# Patient Record
Sex: MALE | Race: WHITE | URBAN - METROPOLITAN AREA
[De-identification: names, ages, dates, MRNs, and addresses within clinical notes are randomized per-mention and may not be internally consistent; named-entity substitution may affect disease eponyms.]

---

## 2023-09-01 ENCOUNTER — APPOINTMENT (OUTPATIENT)
Dept: MRI IMAGING | Age: 43
DRG: 642 | End: 2023-09-01

## 2023-09-01 ENCOUNTER — APPOINTMENT (OUTPATIENT)
Dept: GENERAL RADIOLOGY | Age: 43
DRG: 642 | End: 2023-09-01

## 2023-09-01 ENCOUNTER — APPOINTMENT (OUTPATIENT)
Dept: CT IMAGING | Age: 43
DRG: 642 | End: 2023-09-01

## 2023-09-01 ENCOUNTER — HOSPITAL ENCOUNTER (INPATIENT)
Age: 43
LOS: 2 days | Discharge: HOME | DRG: 642 | End: 2023-09-04
Attending: STUDENT IN AN ORGANIZED HEALTH CARE EDUCATION/TRAINING PROGRAM | Admitting: INTERNAL MEDICINE

## 2023-09-01 DIAGNOSIS — R94.5 ABNORMAL LIVER FUNCTION: ICD-10-CM

## 2023-09-01 DIAGNOSIS — R47.9 SPEECH DISTURBANCE, UNSPECIFIED TYPE: ICD-10-CM

## 2023-09-01 DIAGNOSIS — R41.82 ALTERED MENTAL STATUS, UNSPECIFIED ALTERED MENTAL STATUS TYPE: Primary | ICD-10-CM

## 2023-09-01 DIAGNOSIS — K85.90 ACUTE PANCREATITIS, UNSPECIFIED COMPLICATION STATUS, UNSPECIFIED PANCREATITIS TYPE: ICD-10-CM

## 2023-09-01 DIAGNOSIS — G83.9 PARESIS (HCC): ICD-10-CM

## 2023-09-01 LAB
ALBUMIN SERPL-MCNC: 4.6 G/DL (ref 3.4–5)
ALBUMIN/GLOB SERPL: 1.1 {RATIO} (ref 1.1–2.2)
ALP SERPL-CCNC: 108 U/L (ref 40–129)
ALT SERPL-CCNC: 129 U/L (ref 10–40)
ANION GAP SERPL CALCULATED.3IONS-SCNC: 13 MMOL/L (ref 3–16)
AST SERPL-CCNC: 96 U/L (ref 15–37)
BASE EXCESS BLDV CALC-SCNC: 4.6 MMOL/L (ref -2–3)
BASOPHILS # BLD: 0.1 K/UL (ref 0–0.2)
BASOPHILS NFR BLD: 0.6 %
BILIRUB SERPL-MCNC: 0.5 MG/DL (ref 0–1)
BUN SERPL-MCNC: 11 MG/DL (ref 7–20)
CALCIUM SERPL-MCNC: 10.1 MG/DL (ref 8.3–10.6)
CHLORIDE SERPL-SCNC: 98 MMOL/L (ref 99–110)
CO2 BLDV-SCNC: 31 MMOL/L
CO2 SERPL-SCNC: 26 MMOL/L (ref 21–32)
COHGB MFR BLDV: 1.1 % (ref 0–1.5)
CREAT SERPL-MCNC: 0.8 MG/DL (ref 0.9–1.3)
DEPRECATED RDW RBC AUTO: 12.3 % (ref 12.4–15.4)
DO-HGB MFR BLDV: 40.5 %
EOSINOPHIL # BLD: 0.3 K/UL (ref 0–0.6)
EOSINOPHIL NFR BLD: 3 %
ETHANOLAMINE SERPL-MCNC: NORMAL MG/DL (ref 0–0.08)
GFR SERPLBLD CREATININE-BSD FMLA CKD-EPI: >60 ML/MIN/{1.73_M2}
GLUCOSE BLD-MCNC: 90 MG/DL (ref 70–99)
GLUCOSE SERPL-MCNC: 115 MG/DL (ref 70–99)
HCO3 BLDV-SCNC: 29.7 MMOL/L (ref 24–28)
HCT VFR BLD AUTO: 50.7 % (ref 40.5–52.5)
HGB BLD-MCNC: 17.9 G/DL (ref 13.5–17.5)
LACTATE BLDV-SCNC: 0.9 MMOL/L (ref 0.4–1.9)
LACTATE BLDV-SCNC: 1.2 MMOL/L (ref 0.4–1.9)
LIPASE SERPL-CCNC: 554 U/L (ref 13–60)
LYMPHOCYTES # BLD: 2.3 K/UL (ref 1–5.1)
LYMPHOCYTES NFR BLD: 25.1 %
MAGNESIUM SERPL-MCNC: 2.2 MG/DL (ref 1.8–2.4)
MCH RBC QN AUTO: 33.3 PG (ref 26–34)
MCHC RBC AUTO-ENTMCNC: 35.4 G/DL (ref 31–36)
MCV RBC AUTO: 94.1 FL (ref 80–100)
METHGB MFR BLDV: 0.1 % (ref 0–1.5)
MONOCYTES # BLD: 1.1 K/UL (ref 0–1.3)
MONOCYTES NFR BLD: 11.5 %
NEUTROPHILS # BLD: 5.4 K/UL (ref 1.7–7.7)
NEUTROPHILS NFR BLD: 59.8 %
PCO2 BLDV: 44.1 MMHG (ref 41–51)
PERFORMED ON: NORMAL
PH BLDV: 7.44 [PH] (ref 7.35–7.45)
PLATELET # BLD AUTO: 309 K/UL (ref 135–450)
PMV BLD AUTO: 8.1 FL (ref 5–10.5)
PO2 BLDV: 30.7 MMHG (ref 25–40)
POTASSIUM SERPL-SCNC: 3.9 MMOL/L (ref 3.5–5.1)
PROT SERPL-MCNC: 8.9 G/DL (ref 6.4–8.2)
RBC # BLD AUTO: 5.39 M/UL (ref 4.2–5.9)
SAO2 % BLDV: 59 %
SODIUM SERPL-SCNC: 137 MMOL/L (ref 136–145)
TROPONIN, HIGH SENSITIVITY: 10 NG/L (ref 0–22)
TROPONIN, HIGH SENSITIVITY: 9 NG/L (ref 0–22)
WBC # BLD AUTO: 9.1 K/UL (ref 4–11)

## 2023-09-01 PROCEDURE — 70450 CT HEAD/BRAIN W/O DYE: CPT

## 2023-09-01 PROCEDURE — 82077 ASSAY SPEC XCP UR&BREATH IA: CPT

## 2023-09-01 PROCEDURE — 93005 ELECTROCARDIOGRAM TRACING: CPT | Performed by: INTERNAL MEDICINE

## 2023-09-01 PROCEDURE — 95717 EEG PHYS/QHP 2-12 HR W/O VID: CPT | Performed by: PSYCHIATRY & NEUROLOGY

## 2023-09-01 PROCEDURE — 83605 ASSAY OF LACTIC ACID: CPT

## 2023-09-01 PROCEDURE — 70498 CT ANGIOGRAPHY NECK: CPT

## 2023-09-01 PROCEDURE — 6360000004 HC RX CONTRAST MEDICATION

## 2023-09-01 PROCEDURE — 83690 ASSAY OF LIPASE: CPT

## 2023-09-01 PROCEDURE — 70551 MRI BRAIN STEM W/O DYE: CPT

## 2023-09-01 PROCEDURE — 82803 BLOOD GASES ANY COMBINATION: CPT

## 2023-09-01 PROCEDURE — 71045 X-RAY EXAM CHEST 1 VIEW: CPT

## 2023-09-01 PROCEDURE — 83735 ASSAY OF MAGNESIUM: CPT

## 2023-09-01 PROCEDURE — 85025 COMPLETE CBC W/AUTO DIFF WBC: CPT

## 2023-09-01 PROCEDURE — 84484 ASSAY OF TROPONIN QUANT: CPT

## 2023-09-01 PROCEDURE — 99285 EMERGENCY DEPT VISIT HI MDM: CPT

## 2023-09-01 PROCEDURE — 80053 COMPREHEN METABOLIC PANEL: CPT

## 2023-09-01 PROCEDURE — 84443 ASSAY THYROID STIM HORMONE: CPT

## 2023-09-01 PROCEDURE — 80074 ACUTE HEPATITIS PANEL: CPT

## 2023-09-01 RX ORDER — SODIUM CHLORIDE 0.9 % (FLUSH) 0.9 %
5-40 SYRINGE (ML) INJECTION PRN
Status: DISCONTINUED | OUTPATIENT
Start: 2023-09-01 | End: 2023-09-04 | Stop reason: HOSPADM

## 2023-09-01 RX ORDER — SODIUM CHLORIDE 0.9 % (FLUSH) 0.9 %
5-40 SYRINGE (ML) INJECTION PRN
Status: CANCELLED | OUTPATIENT
Start: 2023-09-01

## 2023-09-01 RX ORDER — SODIUM CHLORIDE 0.9 % (FLUSH) 0.9 %
5-40 SYRINGE (ML) INJECTION EVERY 12 HOURS SCHEDULED
Status: DISCONTINUED | OUTPATIENT
Start: 2023-09-01 | End: 2023-09-04 | Stop reason: HOSPADM

## 2023-09-01 RX ORDER — SODIUM CHLORIDE 9 MG/ML
INJECTION, SOLUTION INTRAVENOUS PRN
Status: DISCONTINUED | OUTPATIENT
Start: 2023-09-01 | End: 2023-09-04 | Stop reason: HOSPADM

## 2023-09-01 RX ORDER — SODIUM CHLORIDE 0.9 % (FLUSH) 0.9 %
10 SYRINGE (ML) INJECTION ONCE
Status: DISCONTINUED | OUTPATIENT
Start: 2023-09-01 | End: 2023-09-02

## 2023-09-01 RX ORDER — SODIUM CHLORIDE 9 MG/ML
INJECTION, SOLUTION INTRAVENOUS PRN
Status: CANCELLED | OUTPATIENT
Start: 2023-09-01

## 2023-09-01 RX ORDER — SODIUM CHLORIDE 0.9 % (FLUSH) 0.9 %
5-40 SYRINGE (ML) INJECTION EVERY 12 HOURS SCHEDULED
Status: CANCELLED | OUTPATIENT
Start: 2023-09-01

## 2023-09-01 RX ADMIN — IOPAMIDOL 75 ML: 755 INJECTION, SOLUTION INTRAVENOUS at 18:34

## 2023-09-01 ASSESSMENT — PAIN - FUNCTIONAL ASSESSMENT: PAIN_FUNCTIONAL_ASSESSMENT: 0-10

## 2023-09-01 NOTE — ED PROVIDER NOTES
[facility]          EM RESIDENT NOTE     Date of evaluation: 9/1/2023    ADDENDUM:      Care of this patient was assumed from Dr. Silvestre Morgan. The patient was seen for Altered Mental Status (Patient was at home sitting in his car for about 10 mins. Patient went inside and sat in the couch with no response. Patient Last know normal was 4 pm. ) and Cerebrovascular Accident  . The patient's initial evaluation and plan have been discussed with the prior provider who initially evaluated the patient. Nursing Notes, Past Medical Hx, Past Surgical Hx, Social Hx, Allergies, and Family Hx were all reviewed. Pending: MRI, repeat trop, consider CT A/P    [facility] / ASSESSMENT / Shai Shows is a 43 y.o. male presenting with AMS. Started feeling \"weird\" earlier today, got home from work and was very confused, couldn't speak, slowly lost all motor function. Had a recent diarrheal illness. Arrived via EMS. Initially aphasic, could not move extremities or follow commands. Now able to follow commands and move all 4 extremities with very little resistance against gravity. C/f stroke of unclear origin. CT head neg. CTA head/neck without LVO. Activated Code Stroke, stroke team conducted telehealth visit, want wake-up MRI as symptoms do not follow a specific vascular territory. On my reassessment, patient is still nonverbal however responds to questions with \"mhmm\" or \"uhuh\" which would be unexpected with aphasia. Strength 5/5 in the LUE and LLE, 4/5 in the RUE and RLE which is significant improvement from initial exam. Benign abdominal exam in s/o elevated AST/ALT and lipase, will defer CT A/P at this time. MRI neg for acute intracranial abnormality, less c/f stroke as etiology of symptoms. However, patient having nonfocal neurological symptoms, consider GBS in s/o recent diarrheal illness.   On further evaluation by attending physician, patient still not speaking, however is able to text to communicate which is not c/w true aphasia. Given negative stroke work-up, improvement in motor exam, and lack of true aphasia, c/f functional element to patient symptoms. LP deferred at this time. However, patient not able to perform daily functions in this current state and will be admitted for further evaluation and optimization. Is this patient to be included in the SEP-1 core measure? No Exclusion criteria - the patient is NOT to be included for SEP-1 Core Measure due to: 2+ SIRS criteria are not met    Medical Decision Making  Amount and/or Complexity of Data Reviewed  Labs: ordered. Radiology: ordered. Risk  Prescription drug management. Decision regarding hospitalization. This patient was also evaluated by the attending physician. All care plans were discussed and agreed upon. Clinical Impression     1. Altered mental status, unspecified altered mental status type    2. Paresis ([address])    3. Speech disturbance, unspecified type    4. Acute pancreatitis, unspecified complication status, unspecified pancreatitis type    5. Abnormal liver function        Disposition     PATIENT REFERRED TO:  No follow-up provider specified. DISCHARGE MEDICATIONS:  New Prescriptions    No medications on file       DISPOSITION Admitted 09/02/2023 12:37:40 AM    At this time the patient has been admitted to Hospitalist for further evaluation and management. The patient will continue to be monitored here in the emergency department until which time he is moved to his new treatment location. Discussed patient's case with admitting physicians. Diagnostic Results and Other Data     RADIOLOGY:  XR CHEST PORTABLE   Final Result      No acute pulmonary disease. MRI BRAIN WO CONTRAST   Final Result      1. No acute intracranial abnormality. CTA HEAD NECK W CONTRAST   Final Result      1. No aneurysms, vascular occlusions, or intracranial stenoses identified.

## 2023-09-01 NOTE — ED NOTES
Hold TNK per MD. Wants to talk to neuro on ipad before decision to give.       Amina Huff RN  09/01/23 [id number]

## 2023-09-01 NOTE — ED PROVIDER NOTES
ED Attending Attestation Note     Date of evaluation: 9/1/2023    This patient was seen by the resident. I have seen and examined the patient, agree with the workup, evaluation, management and diagnosis. The care plan has been discussed. I have reviewed the ECG and concur with the resident's interpretation. This is a patient with no reported significant past medical history who presents by EMS from home with concern for altered mental status and abnormal neurologic exam.  History is initially very limited and is obtained primarily from the wife who notes that he returned home from work where he spent an abnormal amount of time in the garage prior to coming inside where he was found sitting on the couch with a \"glazed look \"and was unresponsive. EMS notes that in route, he had no motor function in any of his extremities, did not appear to respond to pain. He was mildly hypertensive but had a normal glucose. On my initial exam, the patient is mildly ill lying in bed with his eyes open. There is no gaze deviation however initially does not seem to track although this quickly resolves and on further evaluation, it is noted that he blinks to threat and has some restriction in the rightward gaze. He is noted to be spontaneously moving his left hand and is able to wiggle fingers and toes on the left side to command but unable to do it on the right side. He does appear tearful. No obvious facial droop or abnormal tone. He appears to be protecting his airway with no respiratory depression. As he was further settled into the emergency department, it is noted that he will open his mouth and stick out his tongue but when asked to try and make sounds of any kind, he will not make any mouth movements or tongue movements despite instruction to do so.   He continues to have only some effort against gravity in the right upper and lower extremities compared to the left where he is able to at least sustain some

## 2023-09-02 PROBLEM — R41.82 AMS (ALTERED MENTAL STATUS): Status: ACTIVE | Noted: 2023-09-02

## 2023-09-02 LAB
ALBUMIN SERPL-MCNC: 4.1 G/DL (ref 3.4–5)
ALP SERPL-CCNC: 96 U/L (ref 40–129)
ALT SERPL-CCNC: 122 U/L (ref 10–40)
AMPHETAMINES UR QL SCN>1000 NG/ML: ABNORMAL
ANION GAP SERPL CALCULATED.3IONS-SCNC: 18 MMOL/L (ref 3–16)
AST SERPL-CCNC: 94 U/L (ref 15–37)
BARBITURATES UR QL SCN>200 NG/ML: ABNORMAL
BASOPHILS # BLD: 0 K/UL (ref 0–0.2)
BASOPHILS NFR BLD: 0.6 %
BENZODIAZ UR QL SCN>200 NG/ML: ABNORMAL
BILIRUB DIRECT SERPL-MCNC: <0.2 MG/DL (ref 0–0.3)
BILIRUB INDIRECT SERPL-MCNC: ABNORMAL MG/DL (ref 0–1)
BILIRUB SERPL-MCNC: 0.5 MG/DL (ref 0–1)
BILIRUB UR QL STRIP.AUTO: ABNORMAL
BUN SERPL-MCNC: 12 MG/DL (ref 7–20)
CALCIUM SERPL-MCNC: 9.1 MG/DL (ref 8.3–10.6)
CALCIUM SERPL-MCNC: 9.3 MG/DL (ref 8.3–10.6)
CANNABINOIDS UR QL SCN>50 NG/ML: POSITIVE
CHLORIDE SERPL-SCNC: 103 MMOL/L (ref 99–110)
CHOLEST SERPL-MCNC: 214 MG/DL (ref 0–199)
CLARITY UR: CLEAR
CO2 SERPL-SCNC: 17 MMOL/L (ref 21–32)
COCAINE UR QL SCN: ABNORMAL
COLOR UR: YELLOW
CREAT SERPL-MCNC: 0.7 MG/DL (ref 0.9–1.3)
CRP SERPL-MCNC: 9.8 MG/L (ref 0–5.1)
DEPRECATED RDW RBC AUTO: 12.4 % (ref 12.4–15.4)
DRUG SCREEN COMMENT UR-IMP: ABNORMAL
EOSINOPHIL # BLD: 0.4 K/UL (ref 0–0.6)
EOSINOPHIL NFR BLD: 5.3 %
FENTANYL SCREEN, URINE: ABNORMAL
GFR SERPLBLD CREATININE-BSD FMLA CKD-EPI: >60 ML/MIN/{1.73_M2}
GLUCOSE BLD-MCNC: 90 MG/DL (ref 70–99)
GLUCOSE SERPL-MCNC: 97 MG/DL (ref 70–99)
GLUCOSE UR STRIP.AUTO-MCNC: NEGATIVE MG/DL
HAV IGM SERPL QL IA: NORMAL
HBV CORE IGM SERPL QL IA: NORMAL
HBV SURFACE AG SERPL QL IA: NORMAL
HCT VFR BLD AUTO: 46.1 % (ref 40.5–52.5)
HCV AB SERPL QL IA: NORMAL
HDLC SERPL-MCNC: 33 MG/DL (ref 40–60)
HGB BLD-MCNC: 16.5 G/DL (ref 13.5–17.5)
HGB UR QL STRIP.AUTO: NEGATIVE
KETONES UR STRIP.AUTO-MCNC: ABNORMAL MG/DL
LDLC SERPL CALC-MCNC: 160 MG/DL
LEUKOCYTE ESTERASE UR QL STRIP.AUTO: NEGATIVE
LYMPHOCYTES # BLD: 2.1 K/UL (ref 1–5.1)
LYMPHOCYTES NFR BLD: 24.5 %
MAGNESIUM SERPL-MCNC: 2 MG/DL (ref 1.8–2.4)
MCH RBC QN AUTO: 33.6 PG (ref 26–34)
MCHC RBC AUTO-ENTMCNC: 35.8 G/DL (ref 31–36)
MCV RBC AUTO: 93.7 FL (ref 80–100)
METHADONE UR QL SCN>300 NG/ML: ABNORMAL
MONOCYTES # BLD: 1.1 K/UL (ref 0–1.3)
MONOCYTES NFR BLD: 12.7 %
MUCOUS THREADS #/AREA URNS LPF: ABNORMAL /LPF
NEUTROPHILS # BLD: 4.8 K/UL (ref 1.7–7.7)
NEUTROPHILS NFR BLD: 56.9 %
NITRITE UR QL STRIP.AUTO: NEGATIVE
OPIATES UR QL SCN>300 NG/ML: ABNORMAL
OXYCODONE UR QL SCN: ABNORMAL
PCP UR QL SCN>25 NG/ML: ABNORMAL
PERFORMED ON: NORMAL
PH UR STRIP.AUTO: 6.5 [PH] (ref 5–8)
PH UR STRIP: 6.5 [PH]
PHOSPHATE SERPL-MCNC: 3.5 MG/DL (ref 2.5–4.9)
PLATELET # BLD AUTO: 272 K/UL (ref 135–450)
PMV BLD AUTO: 8 FL (ref 5–10.5)
POTASSIUM SERPL-SCNC: 5.5 MMOL/L (ref 3.5–5.1)
PROT SERPL-MCNC: 7.3 G/DL (ref 6.4–8.2)
PROT UR STRIP.AUTO-MCNC: ABNORMAL MG/DL
RBC # BLD AUTO: 4.92 M/UL (ref 4.2–5.9)
RBC #/AREA URNS HPF: ABNORMAL /HPF (ref 0–4)
SODIUM SERPL-SCNC: 138 MMOL/L (ref 136–145)
SP GR UR STRIP.AUTO: 1.02 (ref 1–1.03)
T4 FREE SERPL-MCNC: 1.6 NG/DL (ref 0.9–1.8)
TRIGL SERPL-MCNC: 104 MG/DL (ref 0–150)
TSH SERPL DL<=0.005 MIU/L-ACNC: 2.07 UIU/ML (ref 0.27–4.2)
UA COMPLETE W REFLEX CULTURE PNL UR: ABNORMAL
UA DIPSTICK W REFLEX MICRO PNL UR: YES
URN SPEC COLLECT METH UR: ABNORMAL
UROBILINOGEN UR STRIP-ACNC: 0.2 E.U./DL
VIT B12 SERPL-MCNC: 735 PG/ML (ref 211–911)
VLDLC SERPL CALC-MCNC: 21 MG/DL
WBC # BLD AUTO: 8.5 K/UL (ref 4–11)
WBC #/AREA URNS HPF: ABNORMAL /HPF (ref 0–5)

## 2023-09-02 PROCEDURE — 92610 EVALUATE SWALLOWING FUNCTION: CPT

## 2023-09-02 PROCEDURE — 1200000000 HC SEMI PRIVATE

## 2023-09-02 PROCEDURE — 80307 DRUG TEST PRSMV CHEM ANLYZR: CPT

## 2023-09-02 PROCEDURE — 82310 ASSAY OF CALCIUM: CPT

## 2023-09-02 PROCEDURE — 84100 ASSAY OF PHOSPHORUS: CPT

## 2023-09-02 PROCEDURE — 80061 LIPID PANEL: CPT

## 2023-09-02 PROCEDURE — 84466 ASSAY OF TRANSFERRIN: CPT

## 2023-09-02 PROCEDURE — 84439 ASSAY OF FREE THYROXINE: CPT

## 2023-09-02 PROCEDURE — 80076 HEPATIC FUNCTION PANEL: CPT

## 2023-09-02 PROCEDURE — 6370000000 HC RX 637 (ALT 250 FOR IP)

## 2023-09-02 PROCEDURE — 83735 ASSAY OF MAGNESIUM: CPT

## 2023-09-02 PROCEDURE — 81001 URINALYSIS AUTO W/SCOPE: CPT

## 2023-09-02 PROCEDURE — 6360000002 HC RX W HCPCS: Performed by: STUDENT IN AN ORGANIZED HEALTH CARE EDUCATION/TRAINING PROGRAM

## 2023-09-02 PROCEDURE — 82607 VITAMIN B-12: CPT

## 2023-09-02 PROCEDURE — 83540 ASSAY OF IRON: CPT

## 2023-09-02 PROCEDURE — 51798 US URINE CAPACITY MEASURE: CPT

## 2023-09-02 PROCEDURE — 86140 C-REACTIVE PROTEIN: CPT

## 2023-09-02 PROCEDURE — 2580000003 HC RX 258: Performed by: STUDENT IN AN ORGANIZED HEALTH CARE EDUCATION/TRAINING PROGRAM

## 2023-09-02 PROCEDURE — 80048 BASIC METABOLIC PNL TOTAL CA: CPT

## 2023-09-02 PROCEDURE — 36415 COLL VENOUS BLD VENIPUNCTURE: CPT

## 2023-09-02 PROCEDURE — 94799 UNLISTED PULMONARY SVC/PX: CPT

## 2023-09-02 PROCEDURE — 85025 COMPLETE CBC W/AUTO DIFF WBC: CPT

## 2023-09-02 RX ORDER — ACETAMINOPHEN 325 MG/1
650 TABLET ORAL EVERY 6 HOURS PRN
Status: DISCONTINUED | OUTPATIENT
Start: 2023-09-02 | End: 2023-09-04 | Stop reason: HOSPADM

## 2023-09-02 RX ORDER — MULTIVITAMIN WITH IRON
1 TABLET ORAL DAILY
Status: DISCONTINUED | OUTPATIENT
Start: 2023-09-02 | End: 2023-09-04 | Stop reason: HOSPADM

## 2023-09-02 RX ORDER — SODIUM CHLORIDE 9 MG/ML
INJECTION, SOLUTION INTRAVENOUS PRN
Status: DISCONTINUED | OUTPATIENT
Start: 2023-09-02 | End: 2023-09-04 | Stop reason: HOSPADM

## 2023-09-02 RX ORDER — SODIUM CHLORIDE 0.9 % (FLUSH) 0.9 %
5-40 SYRINGE (ML) INJECTION EVERY 12 HOURS SCHEDULED
Status: DISCONTINUED | OUTPATIENT
Start: 2023-09-02 | End: 2023-09-04 | Stop reason: HOSPADM

## 2023-09-02 RX ORDER — SODIUM CHLORIDE 9 MG/ML
INJECTION, SOLUTION INTRAVENOUS CONTINUOUS
Status: ACTIVE | OUTPATIENT
Start: 2023-09-02 | End: 2023-09-02

## 2023-09-02 RX ORDER — SODIUM CHLORIDE 0.9 % (FLUSH) 0.9 %
5-40 SYRINGE (ML) INJECTION PRN
Status: DISCONTINUED | OUTPATIENT
Start: 2023-09-02 | End: 2023-09-04 | Stop reason: HOSPADM

## 2023-09-02 RX ORDER — THIAMINE HYDROCHLORIDE 100 MG/ML
100 INJECTION, SOLUTION INTRAMUSCULAR; INTRAVENOUS DAILY
Status: DISCONTINUED | OUTPATIENT
Start: 2023-09-03 | End: 2023-09-04 | Stop reason: HOSPADM

## 2023-09-02 RX ORDER — POLYETHYLENE GLYCOL 3350 17 G/17G
17 POWDER, FOR SOLUTION ORAL DAILY PRN
Status: DISCONTINUED | OUTPATIENT
Start: 2023-09-02 | End: 2023-09-04 | Stop reason: HOSPADM

## 2023-09-02 RX ORDER — HYDRALAZINE HYDROCHLORIDE 20 MG/ML
5 INJECTION INTRAMUSCULAR; INTRAVENOUS EVERY 6 HOURS PRN
Status: DISCONTINUED | OUTPATIENT
Start: 2023-09-02 | End: 2023-09-03

## 2023-09-02 RX ORDER — GAUZE BANDAGE 2" X 2"
100 BANDAGE TOPICAL DAILY
Status: CANCELLED | OUTPATIENT
Start: 2023-09-02

## 2023-09-02 RX ORDER — DEXTROSE MONOHYDRATE 100 MG/ML
INJECTION, SOLUTION INTRAVENOUS CONTINUOUS PRN
Status: DISCONTINUED | OUTPATIENT
Start: 2023-09-02 | End: 2023-09-04 | Stop reason: HOSPADM

## 2023-09-02 RX ORDER — ONDANSETRON 2 MG/ML
4 INJECTION INTRAMUSCULAR; INTRAVENOUS EVERY 6 HOURS PRN
Status: DISCONTINUED | OUTPATIENT
Start: 2023-09-02 | End: 2023-09-04 | Stop reason: HOSPADM

## 2023-09-02 RX ORDER — ONDANSETRON 4 MG/1
4 TABLET, ORALLY DISINTEGRATING ORAL EVERY 8 HOURS PRN
Status: DISCONTINUED | OUTPATIENT
Start: 2023-09-02 | End: 2023-09-04 | Stop reason: HOSPADM

## 2023-09-02 RX ADMIN — THIAMINE HYDROCHLORIDE 500 MG: 100 INJECTION, SOLUTION INTRAMUSCULAR; INTRAVENOUS at 02:27

## 2023-09-02 RX ADMIN — SODIUM CHLORIDE, PRESERVATIVE FREE 10 ML: 5 INJECTION INTRAVENOUS at 09:05

## 2023-09-02 RX ADMIN — SODIUM CHLORIDE: 9 INJECTION, SOLUTION INTRAVENOUS at 02:26

## 2023-09-02 RX ADMIN — ONDANSETRON 4 MG: 2 INJECTION INTRAMUSCULAR; INTRAVENOUS at 04:46

## 2023-09-02 RX ADMIN — SODIUM CHLORIDE, PRESERVATIVE FREE 10 ML: 5 INJECTION INTRAVENOUS at 02:13

## 2023-09-02 RX ADMIN — SODIUM CHLORIDE, PRESERVATIVE FREE 10 ML: 5 INJECTION INTRAVENOUS at 20:33

## 2023-09-02 RX ADMIN — THERA TABS 1 TABLET: TAB at 11:15

## 2023-09-02 RX ADMIN — SODIUM CHLORIDE: 9 INJECTION, SOLUTION INTRAVENOUS at 02:23

## 2023-09-02 ASSESSMENT — ENCOUNTER SYMPTOMS
NAUSEA: 1
DIARRHEA: 1
TROUBLE SWALLOWING: 0
EYE PAIN: 0
PHOTOPHOBIA: 0
CONSTIPATION: 0
EYE ITCHING: 0
SINUS PRESSURE: 1
EYE REDNESS: 0
COUGH: 0
CHEST TIGHTNESS: 0
VOMITING: 0
RHINORRHEA: 0
ABDOMINAL PAIN: 1
EYE DISCHARGE: 0
NAUSEA: 0
BLOOD IN STOOL: 0
SORE THROAT: 0
SHORTNESS OF BREATH: 0

## 2023-09-02 NOTE — ED NOTES
ED TO INPATIENT SBAR HANDOFF    Patient Name: Yuri Sneed   :  1980  43 y.o. MRN:  [id number]  Preferred Name  Yuri Sneed   ED Room #:  2TR/2TRA-A2  Family/Caregiver Present no   Restraints no   Sitter no   Sepsis Risk Score Sepsis Risk Score: 0.78    Situation  Code Status: No Order No additional code details. Allergies: Lipitor [atorvastatin]  Weight: Patient Vitals for the past 96 hrs (Last 3 readings):   Weight   23 1808 237 lb 3.2 oz (107.6 kg)     Arrived from: home  Chief Complaint:   Chief Complaint   Patient presents with    Altered Mental Status     Patient was at home sitting in his car for about 10 mins. Patient went inside and sat in the couch with no response. Patient Last know normal was 4 pm.     Cerebrovascular Accident     Hospital Problem/Diagnosis:  Active Problems:    * No active hospital problems. *  Resolved Problems:    * No resolved hospital problems. *    Imaging:   XR CHEST PORTABLE   Final Result      No acute pulmonary disease. MRI BRAIN WO CONTRAST   Final Result      1. No acute intracranial abnormality. CTA HEAD NECK W CONTRAST   Final Result      1. No aneurysms, vascular occlusions, or intracranial stenoses identified. 2.  No significant stenosis in the extracranial vertebral or carotid arteries. CT Head W/O Contrast   Final Result      1. No acute intracranial process. Discussed with Dr. Karl Savage.         Abnormal labs:   Abnormal Labs Reviewed   CBC WITH AUTO DIFFERENTIAL - Abnormal; Notable for the following components:       Result Value    Hemoglobin 17.9 (*)     RDW 12.3 (*)     All other components within normal limits   COMPREHENSIVE METABOLIC PANEL W/ REFLEX TO MG FOR LOW K - Abnormal; Notable for the following components:    Chloride 98 (*)     Glucose 115 (*)     Creatinine 0.8 (*)     Total Protein 8.9 (*)      (*)     AST 96 (*)     All other components within normal limits   LIPASE - Abnormal; Notable for the following components:    Lipase 554.0 (*)     All other components within normal limits   BLOOD GAS, VENOUS - Abnormal; Notable for the following components:    HCO3, Venous 29.7 (*)     Base Excess, Luisito 4.6 (*)     All other components within normal limits     Critical values: no     Abnormal Assessment Findings: None     Background  History:   Past Medical History:   Diagnosis Date    Hyperlipidemia        Assessment    Vitals/MEWS:        Vitals:    09/01/23 2230 09/01/23 2300 09/01/23 2330 09/02/23 0000   BP: (!) 145/90 (!) 152/91 (!) 148/93 (!) 153/92   Pulse: 75 69 78 74   Resp: 14 21 18 18   Temp:       TempSrc:       SpO2: 98% 99% 99% 98%   Weight:       Height:         FiO2 (%): None   O2 Flow Rate: O2 Device: None (Room air)    Cardiac Rhythm:    Pain Assessment:  [] Verbal [] Marly Meg Scale  Pain Scale: Pain Assessment  Pain Assessment: 0-10  Patient's Stated Pain Goal: Unable to verbalize/indicate pain goal  Last documented pain score (0-10 scale)    Last documented pain medication administered: None while here   Mental Status: oriented and alert  Orientation Level:    NIH Score: Total: 5  C-SSRS:    Bedside swallow:    Ingram Coma Scale (GCS): Deepti Coma Scale  Eye Opening: Spontaneous  Best Verbal Response: None  Best Motor Response: Obeys commands  Deepti Coma Scale Score: 11  Active LDA's:   Peripheral IV 09/01/23 Right Antecubital (Active)   Site Assessment Clean, dry & intact 09/01/23 1818   Line Status Blood return noted; Flushed;Normal saline locked;Specimen collected 09/01/23 1818   Phlebitis Assessment No symptoms 09/01/23 1818   Infiltration Assessment 0 09/01/23 1818   Alcohol Cap Used No 09/01/23 1818   Dressing Status New dressing applied;Clean, dry & intact 09/01/23 1818   Dressing Type Transparent 09/01/23 1818   Dressing Intervention New 09/01/23 1818       Peripheral IV 09/01/23 Left Antecubital (Active)   Site Assessment Clean, dry & intact 09/01/23 1819   Line Status Blood return

## 2023-09-02 NOTE — H&P
Internal Medicine  PGY 1  History & Physical      CC: AMS      History Obtained From:  patient, spouse    HISTORY OF PRESENT ILLNESS:   Clarisse Wong is an 43 y.o. male w/ no known medical history who presents for evaluation of aphasia and decreased verbal responsiveness since 1600 when he arrived home from work. 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 He reportedly had to be helped by EMS to stand and move. Pt and his wife deny focal weakness, sudden onset headache, paresthesias, dizziness, syncope, tremors, facial asymmetry, headache, lightheadedness, palpitations, chest pain. The pt texts on his phone (in message chat to his wife) that he has had \"heartburn\" but is unable to state how long. Pt texts that \"words don't make it to throat. \" He also texts that he had \"old batista\" about \"1 week ago. \" The wife notes the batista was cooked in the morning but eaten in the evening. About five days ago he developed nausea and diarrhea and went to an Urgent Care where he was prescribed Zofran. Pt's wife is unsure if the diarrhea has resolved. He additionally confirms some abdominal pain in the upper abdominal area but does not elaborate with words. Unclear onset of abdominal pain. He denies hallucinations or recent increased anxiety or stress; he also denies myalgais but did make a gesture towards his right leg and texted \"cramping\" as well as pointed to his right neck and texted \"neck cramping. \"     Per coworker, he has seemed off for days mentally (\"brain fog\") and reported feeling abnormal at work today, although he could talk and function physically. He and his wife deny increased stressors but she states that \"life is stressful. \"     Pt able to provide partial history through nodding/shaking his head and through texting on his phone in a message chat to his wife.      Per wife, he stopped smoking cigarettes 10 years ago, 72 hours. ABGs: No results for input(s): PHART, WCL7BGG, PO2ART in the last 72 hours. INR: No results for input(s): INR in the last 72 hours. U/A:No results for input(s): NITRITE, COLORU, PHUR, LABCAST, WBCUA, RBCUA, MUCUS, TRICHOMONAS, YEAST, BACTERIA, CLARITYU, SPECGRAV, LEUKOCYTESUR, UROBILINOGEN, BILIRUBINUR, BLOODU, GLUCOSEU, AMORPHOUS in the last 72 hours. Invalid input(s): KETONESU    XR CHEST PORTABLE   Final Result      No acute pulmonary disease. MRI BRAIN WO CONTRAST   Final Result      1. No acute intracranial abnormality. CTA HEAD NECK W CONTRAST   Final Result      1. No aneurysms, vascular occlusions, or intracranial stenoses identified. 2.  No significant stenosis in the extracranial vertebral or carotid arteries. CT Head W/O Contrast   Final Result      1. No acute intracranial process. Discussed with Dr. Angie Ferreira. ASSESSMENT AND PLAN:    Radha Fuentes is an 43 y.o. male w/o known medical history who is admitted for decreased responsiveness. Decreased verbal responsiveness  - nonspecific and inconsistent on exam    - CTH & MRI w/o contrast w/o acute abnormalities    - neurology consulted. Pt may require psychiatry consult   - CMP unremarkable, CBC w/ hgb 17.9, otherwise unremarkable    - pt w/ grossly normal workup thus far with nonspecific symptoms and exam   - will have SLP evaluate for safety given his aphasia   - given diarrhea, will evaluate for c.diff. Elevated lipase to 554 and LFTs  - pt does drink alcohol, may be component of EtOH vs pancreatitis, although abdominal exam nonfocal  - pt reported he has heartburn; pt drinks alcohol    - giving fluids now   - ALT > AST, so questionable EtOH component. Will obtain hepatitis panel & lipid panel to further evaluate.     Will discuss with attending physician Dr. Chucky Correa      Code Status: full   FEN: NPO until SLP eval   PPX: enoxaparin    DISPO: inpatient   ELOS: 2 days   Barriers to

## 2023-09-02 NOTE — PLAN OF CARE
Clinical bedside swallow evaluation completed, please see 9/2/2023 speech pathology note for full details.

## 2023-09-02 NOTE — PROGRESS NOTES
Speech Language Pathology  Facility/Department:[facility]   Evaluation  Name: Kristin Koehler  : 1980  MRN: [id number]                                                     Patient Diagnosis(es):   Patient Active Problem List    Diagnosis Date Noted    AMS (altered mental status) 2023       Past Medical History:   Diagnosis Date    Hyperlipidemia      Past Surgical History:   Procedure Laterality Date    FINGER SURGERY       Reason for Referral:  Kristin Koehler  was referred for a Speech Therapy evaluation to assess swallow function and/or communication. History of Present Illness  Per Emergency Medicine Note 23: Kristin Koehler is a 43 y.o. male presenting with AMS. Started feeling \"weird\" earlier today, got home from work and was very confused, couldn't speak, slowly lost all motor function. Had a recent diarrheal illness. Arrived via EMS. Initially aphasic, could not move extremities or follow commands. Now able to follow commands and move all 4 extremities with very little resistance against gravity. C/f stroke of unclear origin. CT head neg. CTA head/neck without LVO. Activated Code Stroke, stroke team conducted telehealth visit, want wake-up MRI as symptoms do not follow a specific vascular territory. \"    Imaging  XR CHEST PORTABLE   Final Result      No acute pulmonary disease. MRI BRAIN WO CONTRAST   Final Result      1. No acute intracranial abnormality. CTA HEAD NECK W CONTRAST   Final Result      1. No aneurysms, vascular occlusions, or intracranial stenoses identified. 2.  No significant stenosis in the extracranial vertebral or carotid arteries. CT Head W/O Contrast   Final Result      1. No acute intracranial process. Discussed with Dr. Rivera Ewing.         Date of onset: 2023    Current Diet:  Diet NPO    Treatment Diagnosis: Dysphagia    Pain: Denies pain    General:  Chart reviewed: Yes  Behavior/Cognition: Functional, increased cup, serial cup, straw, serial straw)              -Puree (applesauce)              -Regular Solid (kale cracker)     Oral Bolus Management: No oral or lingual residue present following swallow completion. Mastication was timely and complete, however Pt easily fatigued. Laryngeal Excursion: Present per palpitation  Overt s/s of aspiration: None    Prognosis:  Prognosis for improvement: Fair  Barriers to reach goals: None  Consulted and agree with results and recommendations: Pt and JOSIE Milton    Treatment:  Dysphagia Goals: The pt will be seen  2-5x to address the following goals:  1 - Pt will tolerate recommended diet w/o overt s/s of aspiration or respiratory decline. 2 - Pt/caregiver will demonstrate understanding of swallowing recommendations and aspiration precautions independently. Speech Goals: The Pt will be seen x1 to determine need for cognitive-linguistic intervention given when given an assessment. Education  -Pt demonstrated understanding of results/recommendations by restating information provided. -Pt was educated on general aspiration precautions and has demonstrated ability to follow through. Pt goal: Not stated  Pt discharge goal: Not stated    Total treatment time: 25 minutes    Plan:   -Follow-up form this service is indicated              -Will complete cognitive evaluation/screener if necessary   -Will f/u to verify diet tolerance.   -Will f/u to determine candidacy for diet texture upgrade.   -Will determine need for instrumental evaluation through diagnostic tx. Recommendations:  -Diet texture: Soft and Bite Sized  -Liquid:  Thin              -Straws OK  -Regular Oral care  -Aspiration Precautions:   -Upright sitting position during all PO intake   -Remain upright for at least 30 minutes following meal   -Small bites/sips   -Tray set-up needed  -Medication administration: Per Pt preference    Discharge Recommendation: TBD  Discussed with Karine GALINDO  Needs met prior to leaving room, call light within reach, bed in lowest position    Kerry Matos  Speech Language Pathologist     This document will serve as a dc summary if pt dc prior to next visit

## 2023-09-02 NOTE — PROGRESS NOTES
I have seen, examined and evaluated the patient as did the resident physician, on 9/2/23. We have discussed the plan of care and decisions made during that discussion were incorporated into this note. I have reviewed the resident physician's note and agree with the assessment and plan of care as documented. 26-year-old male history of hyperlipidemia, left adrenal adenoma, lumbosacral radiculopathy  -Nausea vomiting and loose stools x1 week, seen at urgent care on 8/29 given Zofran  -Presented to ED 9/1/2023, patient is a hemodialysis nurse, returned back from work and noted to be confused and less verbal, noted to have right-sided weakness. It was reported patient's coworker had reported that patient has been having mentally pain fog and not being normal although able to talk and do his job. No new medications reported  Patient is not a current smoker  Drinks a couple of beers a few evenings but not every day. For concern for confusion/patient being nonverbal stroke team was consulted in the emergency room, initial NIHSS =11, CT head and CTA were unremarkable. Given unclear timing of last known normal for wake-up MRI was done which did not show acute findings, troponin recommended finding known stroke etiologies for his symptoms and recommended against TNK. On exam, he is alert, intermittently slow to respond but able to answer questions appropriately. On my exam gait not assessed but otherwise no focal motor or sensory deficit.   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 cortisol tomorrow  Check vitamin B12  EEG spot study  Continue thiamine, multivitamins  Neurochecks every 4 hours    Hypertension urgency, on admission his blood

## 2023-09-02 NOTE — PROGRESS NOTES
Pt arrived from ED and placed on telemetry. 4 Eyes Skin Assessment     NAME:  Brenda Keller  YOB: 1980  MEDICAL RECORD NUMBER:  [id number]    The patient is being assessed for  Admission    I agree that at least one RN has performed a thorough Head to Toe Skin Assessment on the patient. ALL assessment sites listed below have been assessed. Areas assessed by both nurses:    Head, Face, Ears, Shoulders, Back, Chest, Arms, Elbows, Hands, Sacrum. Buttock, Coccyx, Ischium, Legs. Feet and Heels, and Under Medical Devices   -Abrasion to LUE      Does the Patient have a Wound?  No noted wound(s)       Carlos Prevention initiated by RN: Yes  Wound Care Orders initiated by RN: No    Pressure Injury (Stage 3,4, Unstageable, DTI, NWPT, and Complex wounds) if present, place Wound referral order by RN under : No    New Ostomies, if present place, Ostomy referral order under : No     Nurse 1 eSignature: Electronically signed by Marky Contreras RN on 9/2/23 at 2:49 AM EDT    **SHARE this note so that the co-signing nurse can place an eSignature**    Nurse 2 eSignature: Electronically signed by Ruben Guerrero RN on 9/2/23 at 4:01 AM EDT

## 2023-09-02 NOTE — PLAN OF CARE
Problem: Neurosensory - Adult  Goal: Achieves stable or improved neurological status  Outcome: Progressing  Flowsheets (Taken 9/2/2023 0357)  Achieves stable or improved neurological status:   Assess for and report changes in neurological status   Maintain blood pressure and fluid volume within ordered parameters to optimize cerebral perfusion and minimize risk of hemorrhage  Note: Pt is alert and oriented to person, place, and time at this time. Problem: Neurosensory - Adult  Goal: Achieves maximal functionality and self care  Outcome: Progressing  Flowsheets (Taken 9/2/2023 0357)  Achieves maximal functionality and self care:   Encourage and assist patient to increase activity and self care with guidance from physical therapy/occupational therapy   Monitor swallowing and airway patency with patient fatigue and changes in neurological status  Note: Pt has right sided weakness. Problem: Safety - Adult  Goal: Free from fall injury  Outcome: Progressing  Flowsheets (Taken 9/2/2023 0357)  Free From Fall Injury: Instruct family/caregiver on patient safety  Note: Fall precautions are in place. Bed alarm is on and in lowest position. Pt is using call light appropriately. Will continue to monitor.        Problem: Pain  Goal: Verbalizes/displays adequate comfort level or baseline comfort level  Outcome: Progressing  Flowsheets (Taken 9/2/2023 0357)  Verbalizes/displays adequate comfort level or baseline comfort level:   Encourage patient to monitor pain and request assistance   Assess pain using appropriate pain scale   Administer analgesics based on type and severity of pain and evaluate response   Implement non-pharmacological measures as appropriate and evaluate response     Problem: Discharge Planning  Goal: Discharge to home or other facility with appropriate resources  Outcome: Progressing  Flowsheets (Taken 9/2/2023 0357)  Discharge to home or other facility with appropriate resources:   Identify barriers

## 2023-09-02 NOTE — CONSULTS
Stroke Team Telemedicine Consult:    Asked to see this 44 yo man for possible stroke. Collateral history was provided by his wife at bedside and a coworker I was able to reach by phone (he is an RN at a dialysis facility). He was initially reported as LSN at 4:00 pm, however is wife clearly states he was not normal when he arrived home from work at that time. He was confused and less verbal.  He later developed motor dysfunction and by arrival in the ER was not talking. His coworker said he has seemed off for days mentally (\"brain fog\") and reported feeling abnormal at work today, although he could talk and function physically. He has had a GI illness for 5 days with vomiting and has \"lost 5 pounds. \"  His coworker mentioned he had a \"kidney tumor\"--review of Epic shows this is an adrenal \"incidentaloma\" evaluated in 2022. Wife indicates he is otherwise healthy on no prescription medications. He may have a few alcoholic beverages in the evening. On telemedicine exam he was awake and breathing without difficulty. He seemed to understand everything that was said to him but made no apparent attempt to speak. He would stick out his tongue on command and  his wife's hand in response to questions. In this manner he answered that he felt abnormal today since before lunch. He produced no words nor moved his mouth or made any vocal sound. However my impression was this was unlikely to be aphasia. He could move his eyes both horizontally and vertically in all directions. His eyes were conjugate. He had little spontaneous movement of his limbs but when held up by others he could keep them off the bed with the exception of the left leg which drifted to the  bed. His sensation appeared intact as did his vision. His planter responses were down.     NIHSS = LOC 0 LOCc 0 LOCq 2 Face 2 (would not smile but face was not drooping and he could open his mouth and stick out his tongue so this is questionable) Gaze 0 Fields 0 Arms 1/1 Legs 1/2 Sensation 0 Ataxia 0 Dysarthria 2 Aphasia 0 Neglect 0 = 11    His head CT and CTA were reviewed and unremarkable. The basilar was patent. His initial labs showed elevated lipase and LFTs. Given the unclear timeline and unusual clinical exam a WAKE UP MRI was ordered, which I reviewed (no papito given). It appeared normal with no diffusion restriction. Impression:    Challenging case. Mr. Holley Rubalcava has had a GI illness for nearly a week and has not felt mentally \"right\" for days per the patient and coworkers. This evening he developed progressive symptoms described above. His exam looked superficially most like a basilar occlusion but 1) his basilar was patent on CTA and 2) he could move his eyes horizontally and vertically, had conjugate gaze, had no respiratory difficulty, and had downgoing toes. He superficially appeared to have expressive aphasia but I think this unlikely as his comprehension was normal and there was no apparent attempt to produce words. Anarthria is possible but seemed unlikely, and he had no difficulty controlling his secretions. Thus, I had great difficulty localizing a neurological lesion. While acute strokes can be MRI negative (especially in the posterior fossa) given the considerations above I considered this possible but unlikely. Other non-stroke etiologies, including an infectious or non-organic presentation, remain possible. Recommendations:    I ultimately recommended against TNK given the symptom timeline, diagnostic uncertainty, negative imaging, and possibility he may need invasive diagnostic testing such as a lumbar puncture. There was no identified LVO to pursue with embolectomy. Given recent significant vomiting recommend treating with thiamine and checking other nutritional labs. Discussed in detail with the ER attending who will continue the diagnostic work up.   Please consult local neurology for additional guidance

## 2023-09-02 NOTE — CONSULTS
Neurology Consultation Note      Patient: Kathy Estrella MRN: [id number]    YOB: 1980  Age: [place] y.o. Sex: male   Unit: [facility] 4 PCU Room/Bed: 5781/8077-65 Location: [address] [place] [address]    Date of Consultation: 9/2/2023  Date of Admission: 9/1/2023  5:59 PM ( LOS: 0 days )  Admitting Physician: Liz Jerez    Primary Care Physician: Clare Olvera MD   Consult Requested By: Primary     Reason for Consult: \"AMS\"    ASSESSMENT & RECOMMENDATIONS     Assessment  #AMS  #Weakness  #Numbness    Patient is presenting with AMS and subjective weakness and language disturbance. Neurologic exam is non-organic and has significant functional signs. Patient adamantly denies stressors. MRI brain and CTA head/neck unremarkable. Overall suspicion for an organic neurological disease is low. Will check EEG, and given his recent vomiting check B12. Agree with empiric thiamine. LP is considered, but given non-organic exam, normal neuroimaging and non-toxic appearance suspicion for a neuroinflammatory/infectious etiology is low. He is being evaluated for a toxic-metabolic etiology and/or infection per primary team which could be contributing. Recommendations  - EEG ordered  - empiric thiamine given per primary team, and agree with this  - B12 lab  - Do not recommend LP, but if patient clinically declines and/or develops fevers would consider  - at this time suspect a functional disorder    Carol Rodriguez MD  Neurology      SUBJECTIVE     Chief Complaint:   AMS    History of Present Illness:  Kathy Estrella is a [place] y.o. man presenting with AMS. Patient has reportedly had limited responsiveness and limited verbal output. He drove himself from home to work and then sat down and would not speak. He does not have any focal weakness, headache, paresthesias. The patient reports he was in his truck and then \"suddenly became less responsive. \" He speaks in a low voice and says this is not normal. He feels

## 2023-09-02 NOTE — PLAN OF CARE
Problem: Discharge Planning  Goal: Discharge to home or other facility with appropriate resources  9/2/2023 1746 by Stephanie Gill RN  Outcome: Progressing  Flowsheets (Taken 9/2/2023 0357 by Manuel Pierre RN)  Discharge to home or other facility with appropriate resources:   Identify barriers to discharge with patient and caregiver   Arrange for needed discharge resources and transportation as appropriate   Identify discharge learning needs (meds, wound care, etc)  9/2/2023 0357 by Manuel Pierre RN  Outcome: Progressing  Flowsheets (Taken 9/2/2023 0357)  Discharge to home or other facility with appropriate resources:   Identify barriers to discharge with patient and caregiver   Arrange for needed discharge resources and transportation as appropriate   Identify discharge learning needs (meds, wound care, etc)  Problem: Safety - Adult  Goal: Free from fall injury  9/2/2023 1746 by Stephanie Gill RN  Outcome: Progressing  Flowsheets (Taken 9/2/2023 0357 by Manuel Pierre RN)  Free From Fall Injury: Instruct family/caregiver on patient safety          Problem: Neurosensory - Adult  Goal: Achieves stable or improved neurological status  9/2/2023 1746 by Stephanie Gill RN  Outcome: Progressing  Flowsheets (Taken 9/2/2023 0357 by Manuel Pierre RN)  Achieves stable or improved neurological status:   Assess for and report changes in neurological status   Maintain blood pressure and fluid volume within ordered parameters to optimize cerebral perfusion and minimize risk of hemorrhage  Note: Assessing and monitoring neuro status

## 2023-09-02 NOTE — PROGRESS NOTES
Internal Medicine Progress Note    Patient Name: Herminia Curran   Patient : 1980   Date: 2023   Admit Date: 2023     CC: Altered Mental Status (Patient was at home sitting in his car for about 10 mins. Patient went inside and sat in the couch with no response. Patient Last know normal was 4 pm. ) and Cerebrovascular Accident       Interval History     Afebrile, saturating well on RA  BP 140s-150s/90s  NSR on telemetry  HyperK 5.5    Mr. Dany Mack was seen and examined with his wife at bedside. He appears fatigued; able to respond appropriately to inquiries with soft tone and paucity of speech. He denies any numbness/tingling at present time; however, he endorses generalized BUE and BLE weakness. ROS     Review of Systems   Constitutional:  Positive for diaphoresis and fatigue. Negative for chills and fever. HENT:  Positive for ear pain (intermittent, right-sided ear fullness, \"under water\") and sinus pressure (across forehead). Negative for congestion, nosebleeds, rhinorrhea, sore throat, tinnitus and trouble swallowing. Eyes:  Positive for visual disturbance (blurry vision right eye x \"few days\"). Negative for photophobia, pain, discharge, redness and itching. Respiratory:  Negative for cough, chest tightness and shortness of breath. Cardiovascular:  Negative for chest pain, palpitations and leg swelling. Gastrointestinal:  Positive for abdominal pain (3-day h/o left side, 2/10 - unable to decipher whether chronic or intermittent, quality) and diarrhea (watery since  after eating batista; no other family members with similar sxs). Negative for blood in stool, constipation and nausea. Endocrine: Negative for polydipsia, polyphagia and polyuria. Genitourinary:  Positive for difficulty urinating (getting the urine stream started). Negative for decreased urine volume, dysuria, frequency, hematuria and urgency. Musculoskeletal:  Negative for joint swelling and neck stiffness.

## 2023-09-03 LAB
ALBUMIN SERPL-MCNC: 4 G/DL (ref 3.4–5)
ALP SERPL-CCNC: 100 U/L (ref 40–129)
ALT SERPL-CCNC: 162 U/L (ref 10–40)
ANION GAP SERPL CALCULATED.3IONS-SCNC: 13 MMOL/L (ref 3–16)
AST SERPL-CCNC: 109 U/L (ref 15–37)
BASOPHILS # BLD: 0.1 K/UL (ref 0–0.2)
BASOPHILS NFR BLD: 0.7 %
BILIRUB DIRECT SERPL-MCNC: <0.2 MG/DL (ref 0–0.3)
BILIRUB INDIRECT SERPL-MCNC: ABNORMAL MG/DL (ref 0–1)
BILIRUB SERPL-MCNC: 0.6 MG/DL (ref 0–1)
BUN SERPL-MCNC: 13 MG/DL (ref 7–20)
CALCIUM SERPL-MCNC: 9.2 MG/DL (ref 8.3–10.6)
CHLORIDE SERPL-SCNC: 101 MMOL/L (ref 99–110)
CO2 SERPL-SCNC: 23 MMOL/L (ref 21–32)
CORTIS SERPL-MCNC: 6.9 UG/DL
CREAT SERPL-MCNC: 0.7 MG/DL (ref 0.9–1.3)
DEPRECATED RDW RBC AUTO: 12.5 % (ref 12.4–15.4)
EKG ATRIAL RATE: 91 BPM
EKG DIAGNOSIS: NORMAL
EKG P AXIS: 51 DEGREES
EKG P-R INTERVAL: 148 MS
EKG Q-T INTERVAL: 352 MS
EKG QRS DURATION: 88 MS
EKG QTC CALCULATION (BAZETT): 432 MS
EKG R AXIS: 22 DEGREES
EKG T AXIS: -14 DEGREES
EKG VENTRICULAR RATE: 91 BPM
EOSINOPHIL # BLD: 0.6 K/UL (ref 0–0.6)
EOSINOPHIL NFR BLD: 6.9 %
FERRITIN SERPL IA-MCNC: 1938 NG/ML (ref 30–400)
GFR SERPLBLD CREATININE-BSD FMLA CKD-EPI: >60 ML/MIN/{1.73_M2}
GGT SERPL-CCNC: 95 U/L (ref 8–61)
GLUCOSE BLD-MCNC: 115 MG/DL (ref 70–99)
GLUCOSE SERPL-MCNC: 86 MG/DL (ref 70–99)
HCT VFR BLD AUTO: 48.6 % (ref 40.5–52.5)
HGB BLD-MCNC: 16.5 G/DL (ref 13.5–17.5)
IRON SATN MFR SERPL: 94 % (ref 20–50)
IRON SERPL-MCNC: 240 UG/DL (ref 59–158)
LYMPHOCYTES # BLD: 3.1 K/UL (ref 1–5.1)
LYMPHOCYTES NFR BLD: 36.1 %
MAGNESIUM SERPL-MCNC: 2.1 MG/DL (ref 1.8–2.4)
MCH RBC QN AUTO: 33.4 PG (ref 26–34)
MCHC RBC AUTO-ENTMCNC: 33.9 G/DL (ref 31–36)
MCV RBC AUTO: 98.4 FL (ref 80–100)
MONOCYTES # BLD: 1 K/UL (ref 0–1.3)
MONOCYTES NFR BLD: 11.3 %
NEUTROPHILS # BLD: 3.8 K/UL (ref 1.7–7.7)
NEUTROPHILS NFR BLD: 45 %
PERFORMED ON: ABNORMAL
PHOSPHATE SERPL-MCNC: 3.5 MG/DL (ref 2.5–4.9)
PLATELET # BLD AUTO: 281 K/UL (ref 135–450)
PMV BLD AUTO: 7.8 FL (ref 5–10.5)
POTASSIUM SERPL-SCNC: 3.9 MMOL/L (ref 3.5–5.1)
PROT SERPL-MCNC: 7.5 G/DL (ref 6.4–8.2)
RBC # BLD AUTO: 4.94 M/UL (ref 4.2–5.9)
SODIUM SERPL-SCNC: 137 MMOL/L (ref 136–145)
TIBC SERPL-MCNC: 254 UG/DL (ref 260–445)
TRANSFERRIN SERPL-MCNC: 224 MG/DL (ref 200–360)
WBC # BLD AUTO: 8.5 K/UL (ref 4–11)

## 2023-09-03 PROCEDURE — 82977 ASSAY OF GGT: CPT

## 2023-09-03 PROCEDURE — 80076 HEPATIC FUNCTION PANEL: CPT

## 2023-09-03 PROCEDURE — 2580000003 HC RX 258

## 2023-09-03 PROCEDURE — 85025 COMPLETE CBC W/AUTO DIFF WBC: CPT

## 2023-09-03 PROCEDURE — 80069 RENAL FUNCTION PANEL: CPT

## 2023-09-03 PROCEDURE — 6370000000 HC RX 637 (ALT 250 FOR IP)

## 2023-09-03 PROCEDURE — 36415 COLL VENOUS BLD VENIPUNCTURE: CPT

## 2023-09-03 PROCEDURE — 1200000000 HC SEMI PRIVATE

## 2023-09-03 PROCEDURE — 83735 ASSAY OF MAGNESIUM: CPT

## 2023-09-03 PROCEDURE — 82533 TOTAL CORTISOL: CPT

## 2023-09-03 PROCEDURE — 2580000003 HC RX 258: Performed by: STUDENT IN AN ORGANIZED HEALTH CARE EDUCATION/TRAINING PROGRAM

## 2023-09-03 PROCEDURE — 86038 ANTINUCLEAR ANTIBODIES: CPT

## 2023-09-03 PROCEDURE — 82728 ASSAY OF FERRITIN: CPT

## 2023-09-03 PROCEDURE — 6360000002 HC RX W HCPCS: Performed by: INTERNAL MEDICINE

## 2023-09-03 RX ORDER — PANTOPRAZOLE SODIUM 40 MG/1
40 TABLET, DELAYED RELEASE ORAL
Status: DISCONTINUED | OUTPATIENT
Start: 2023-09-04 | End: 2023-09-04 | Stop reason: HOSPADM

## 2023-09-03 RX ORDER — SODIUM CHLORIDE, SODIUM LACTATE, POTASSIUM CHLORIDE, CALCIUM CHLORIDE 600; 310; 30; 20 MG/100ML; MG/100ML; MG/100ML; MG/100ML
INJECTION, SOLUTION INTRAVENOUS CONTINUOUS
Status: DISCONTINUED | OUTPATIENT
Start: 2023-09-03 | End: 2023-09-04

## 2023-09-03 RX ORDER — HYDRALAZINE HYDROCHLORIDE 20 MG/ML
5 INJECTION INTRAMUSCULAR; INTRAVENOUS EVERY 6 HOURS PRN
Status: DISCONTINUED | OUTPATIENT
Start: 2023-09-03 | End: 2023-09-04 | Stop reason: HOSPADM

## 2023-09-03 RX ADMIN — THERA TABS 1 TABLET: TAB at 08:06

## 2023-09-03 RX ADMIN — SODIUM CHLORIDE, PRESERVATIVE FREE 10 ML: 5 INJECTION INTRAVENOUS at 20:35

## 2023-09-03 RX ADMIN — SODIUM CHLORIDE, POTASSIUM CHLORIDE, SODIUM LACTATE AND CALCIUM CHLORIDE: 600; 310; 30; 20 INJECTION, SOLUTION INTRAVENOUS at 20:34

## 2023-09-03 RX ADMIN — SODIUM CHLORIDE, PRESERVATIVE FREE 10 ML: 5 INJECTION INTRAVENOUS at 08:06

## 2023-09-03 RX ADMIN — THIAMINE HYDROCHLORIDE 100 MG: 100 INJECTION, SOLUTION INTRAMUSCULAR; INTRAVENOUS at 08:05

## 2023-09-03 ASSESSMENT — ENCOUNTER SYMPTOMS
CONSTIPATION: 0
PHOTOPHOBIA: 0
RHINORRHEA: 0
EYE DISCHARGE: 0
EYE ITCHING: 0
ABDOMINAL PAIN: 1
EYE PAIN: 0
EYE REDNESS: 0
CHEST TIGHTNESS: 0
TROUBLE SWALLOWING: 0
BLOOD IN STOOL: 0
COUGH: 0
SHORTNESS OF BREATH: 0
SORE THROAT: 0
SINUS PRESSURE: 1
NAUSEA: 1
DIARRHEA: 0

## 2023-09-03 NOTE — PROGRESS NOTES
Comprehensive Nutrition Assessment    RECOMMENDATIONS:  PO Diet: Dysphagia- soft & bite sized   ONS: Ensure compact BID  Nutrition Education: Education not indicated     NUTRITION ASSESSMENT:   Nutritional summary & status: +IP r/t MST 2.  lbs w/ hx wt unknown d/t no previous admits or wt hx available per EMR. Noted per MD, pt is noncommunicative. All information obtained from chart review. Noted pt is on a dysphagia- soft & bite sized diet, being followed by speech. Pt was admitted 2/2 AMS & aphasia. D/t reported unintended wt loss & poor appetite, will order ONS BID w/ meals & assess acceptance / oral intakes at f/u visit & modify prn. Continue to monitor pt per [facility] throughout LOS; RD following. Admission // PMH: AMS, aphasia and decreased verbal responsiveness    MALNUTRITION ASSESSMENT  Context of Malnutrition: Acute Illness   Malnutrition Status: Insufficient data  Findings of the 6 clinical characteristics of malnutrition (Minimum of 2 out of 6 clinical characteristics is required to make the diagnosis of moderate or severe Protein Calorie Malnutrition based on AND/ASPEN Guidelines):  Energy Intake:  Unable to assess  Weight Loss:  Unable to assess     Body Fat Loss:  Unable to assess     Muscle Mass Loss:  Unable to assess      NUTRITION DIAGNOSIS   Unintended weight loss related to cognitive or neurological impairment as evidenced by poor intake prior to admission    Nutrition Monitoring and Evaluation:   Food/Nutrient Intake Outcomes:  Food and Nutrient Intake, Supplement Intake  Physical Signs/Symptoms Outcomes:  Biochemical Data, Weight, Hemodynamic Status, GI Status, Nutrition Focused Physical Findings     OBJECTIVE DATA: Significant to nutrition assessment  Nutrition Related Findings: lbm pta. No edema. Labs reviewed  Wounds: None  Nutrition Goals: Meet at least 75% of estimated needs, by next RD assessment     CURRENT NUTRITION THERAPIES  ADULT DIET;  Dysphagia - Soft and Bite Sized  ADULT ORAL NUTRITION SUPPLEMENT; Lunch, Dinner; Low Calorie/High Protein Oral Supplement  PO Intake: Unable to assess   PO Supplement Intake:None Ordered  Additional Sources of Calories/IVF:N/A     COMPARATIVE STANDARDS  Energy (kcal):  0189-7186 (20-25 kcal/kg CBW)     Protein (g):   (0.8-1.0 g/kg CBW)       Fluid (ml/day):  or per MD    ANTHROPOMETRICS  Current Height: 6' 2\" (188 cm)  Current Weight - Scale: 232 lb 12.9 oz (105.6 kg)    Admission weight: 237 lb 3.2 oz (107.6 kg)    The patient will be monitored per nutrition standards of care. Consult dietitian if additional nutrition interventions are needed prior to RD reassessment.      Gelacio Sampson, [address] [address]:  [phone]  Office:  [phone]

## 2023-09-03 NOTE — PROGRESS NOTES
Internal Medicine Progress Note    Patient Name: Narinder Rouse   Patient : 1980   Date: 9/3/2023   Admit Date: 2023     CC: Altered Mental Status (Patient was at home sitting in his car for about 10 mins. Patient went inside and sat in the couch with no response. Patient Last know normal was 4 pm. ) and Cerebrovascular Accident       Interval History     Afebrile  Saturating well on RA  UOP (24 h):  711 mL, no retention on bladder scan  LFTs increasing  Ferritin is markedly elevated at > 4x ULN  No BM since admission     Mr. Tatiana Giles was seen and examined at bedside. Ongoing fatigue and generalized weakness. Soft-toned responses continue. ROS     Review of Systems   Constitutional:  Positive for diaphoresis and fatigue. Negative for chills and fever. HENT:  Positive for sinus pressure (across forehead). Negative for congestion, ear pain (intermittent, right-sided ear fullness, \"under water\"), nosebleeds, rhinorrhea, sore throat, tinnitus and trouble swallowing. Eyes:  Positive for visual disturbance (blurry vision right eye x \"few days\"). Negative for photophobia, pain, discharge, redness and itching. Respiratory:  Negative for cough, chest tightness and shortness of breath. Cardiovascular:  Negative for chest pain, palpitations and leg swelling. Gastrointestinal:  Positive for abdominal pain (3-day h/o left side, 2/10 - unable to decipher whether chronic or intermittent, quality) and nausea. Negative for blood in stool, constipation and diarrhea. Endocrine: Negative for polydipsia, polyphagia and polyuria. Genitourinary:  Positive for difficulty urinating (getting the urine stream started). Negative for decreased urine volume, dysuria, frequency, hematuria and urgency. Musculoskeletal:  Negative for joint swelling and neck stiffness. Skin:  Negative for rash and wound. Neurological:  Positive for weakness. Negative for dizziness, light-headedness, numbness and headaches.

## 2023-09-03 NOTE — PROGRESS NOTES
Brief Neurology Note:    - EEG not completed yet (may not happen until Tuesday due to holiday)  - labs with worsening transaminitis    Will add on ammonia given liver labs. Defer to primary team for work-up on this. Possible he could be having a toxic-metabolic encephalopathy from some other systemic process. As per initial note no definitive evidence to support a primary neurologic illness with the current data. Will follow-up after EEG completed.     Jed Beltrán MD  Neurology

## 2023-09-03 NOTE — PLAN OF CARE
Problem: Pain  Goal: Verbalizes/displays adequate comfort level or baseline comfort level  Outcome: Progressing  Flowsheets (Taken 9/3/2023 0405)  Verbalizes/displays adequate comfort level or baseline comfort level:   Encourage patient to monitor pain and request assistance   Assess pain using appropriate pain scale   Administer analgesics based on type and severity of pain and evaluate response   Implement non-pharmacological measures as appropriate and evaluate response     Problem: Safety - Adult  Goal: Free from fall injury  9/3/2023 0405 by Arpit Chiu RN  Outcome: Progressing  Flowsheets (Taken 9/3/2023 0405)  Free From Fall Injury: Instruct family/caregiver on patient safety  Note: Fall precautions are in place. Bed alarm is on and in lowest position. Pt is using call light appropriately. Will continue to monitor.        Problem: Neurosensory - Adult  Goal: Achieves stable or improved neurological status  9/3/2023 0405 by Arpit Chiu RN  Outcome: Progressing  Flowsheets (Taken 9/3/2023 0405)  Achieves stable or improved neurological status: Assess for and report changes in neurological status     Problem: Neurosensory - Adult  Goal: Achieves maximal functionality and self care  Outcome: Progressing  Flowsheets (Taken 9/3/2023 0405)  Achieves maximal functionality and self care:   Encourage and assist patient to increase activity and self care with guidance from physical therapy/occupational therapy   Encourage visually impaired, hearing impaired and aphasic patients to use assistive/communication devices

## 2023-09-04 VITALS
HEIGHT: 74 IN | OXYGEN SATURATION: 99 % | BODY MASS INDEX: 30.61 KG/M2 | DIASTOLIC BLOOD PRESSURE: 78 MMHG | SYSTOLIC BLOOD PRESSURE: 138 MMHG | HEART RATE: 74 BPM | RESPIRATION RATE: 14 BRPM | TEMPERATURE: 98.7 F | WEIGHT: 238.54 LBS

## 2023-09-04 LAB
ALBUMIN SERPL-MCNC: 4.1 G/DL (ref 3.4–5)
ALP SERPL-CCNC: 96 U/L (ref 40–129)
ALT SERPL-CCNC: 143 U/L (ref 10–40)
AMMONIA PLAS-SCNC: 38 UMOL/L (ref 16–60)
ANA SER QL IA: NEGATIVE
ANION GAP SERPL CALCULATED.3IONS-SCNC: 13 MMOL/L (ref 3–16)
AST SERPL-CCNC: 76 U/L (ref 15–37)
BASOPHILS # BLD: 0.1 K/UL (ref 0–0.2)
BASOPHILS NFR BLD: 0.5 %
BILIRUB DIRECT SERPL-MCNC: <0.2 MG/DL (ref 0–0.3)
BILIRUB INDIRECT SERPL-MCNC: ABNORMAL MG/DL (ref 0–1)
BILIRUB SERPL-MCNC: 0.6 MG/DL (ref 0–1)
BUN SERPL-MCNC: 10 MG/DL (ref 7–20)
CALCIUM SERPL-MCNC: 9.3 MG/DL (ref 8.3–10.6)
CHLORIDE SERPL-SCNC: 102 MMOL/L (ref 99–110)
CO2 SERPL-SCNC: 24 MMOL/L (ref 21–32)
CREAT SERPL-MCNC: 0.6 MG/DL (ref 0.9–1.3)
DEPRECATED RDW RBC AUTO: 12.2 % (ref 12.4–15.4)
EOSINOPHIL # BLD: 0.5 K/UL (ref 0–0.6)
EOSINOPHIL NFR BLD: 5.1 %
GFR SERPLBLD CREATININE-BSD FMLA CKD-EPI: >60 ML/MIN/{1.73_M2}
GLUCOSE SERPL-MCNC: 91 MG/DL (ref 70–99)
HCT VFR BLD AUTO: 45.5 % (ref 40.5–52.5)
HGB BLD-MCNC: 16.3 G/DL (ref 13.5–17.5)
LYMPHOCYTES # BLD: 2.6 K/UL (ref 1–5.1)
LYMPHOCYTES NFR BLD: 25.2 %
MAGNESIUM SERPL-MCNC: 2 MG/DL (ref 1.8–2.4)
MCH RBC QN AUTO: 33.6 PG (ref 26–34)
MCHC RBC AUTO-ENTMCNC: 35.7 G/DL (ref 31–36)
MCV RBC AUTO: 94.1 FL (ref 80–100)
MONOCYTES # BLD: 0.9 K/UL (ref 0–1.3)
MONOCYTES NFR BLD: 8.8 %
NEUTROPHILS # BLD: 6.2 K/UL (ref 1.7–7.7)
NEUTROPHILS NFR BLD: 60.4 %
PHOSPHATE SERPL-MCNC: 3.4 MG/DL (ref 2.5–4.9)
PLATELET # BLD AUTO: 313 K/UL (ref 135–450)
PMV BLD AUTO: 8.1 FL (ref 5–10.5)
POTASSIUM SERPL-SCNC: 3.9 MMOL/L (ref 3.5–5.1)
PROT SERPL-MCNC: 7.3 G/DL (ref 6.4–8.2)
RBC # BLD AUTO: 4.84 M/UL (ref 4.2–5.9)
SODIUM SERPL-SCNC: 139 MMOL/L (ref 136–145)
WBC # BLD AUTO: 10.3 K/UL (ref 4–11)

## 2023-09-04 PROCEDURE — 80076 HEPATIC FUNCTION PANEL: CPT

## 2023-09-04 PROCEDURE — 2580000003 HC RX 258: Performed by: STUDENT IN AN ORGANIZED HEALTH CARE EDUCATION/TRAINING PROGRAM

## 2023-09-04 PROCEDURE — 6360000002 HC RX W HCPCS: Performed by: INTERNAL MEDICINE

## 2023-09-04 PROCEDURE — 87506 IADNA-DNA/RNA PROBE TQ 6-11: CPT

## 2023-09-04 PROCEDURE — 85025 COMPLETE CBC W/AUTO DIFF WBC: CPT

## 2023-09-04 PROCEDURE — 82140 ASSAY OF AMMONIA: CPT

## 2023-09-04 PROCEDURE — 80069 RENAL FUNCTION PANEL: CPT

## 2023-09-04 PROCEDURE — 2580000003 HC RX 258

## 2023-09-04 PROCEDURE — 36415 COLL VENOUS BLD VENIPUNCTURE: CPT

## 2023-09-04 PROCEDURE — 81256 HFE GENE: CPT

## 2023-09-04 PROCEDURE — 6370000000 HC RX 637 (ALT 250 FOR IP)

## 2023-09-04 PROCEDURE — 95819 EEG AWAKE AND ASLEEP: CPT

## 2023-09-04 PROCEDURE — 83735 ASSAY OF MAGNESIUM: CPT

## 2023-09-04 RX ORDER — PANTOPRAZOLE SODIUM 40 MG/1
40 TABLET, DELAYED RELEASE ORAL
Qty: 30 TABLET | Refills: 3 | Status: SHIPPED | OUTPATIENT
Start: 2023-09-05

## 2023-09-04 RX ORDER — MULTIVITAMIN WITH IRON
1 TABLET ORAL DAILY
Refills: 0 | COMMUNITY
Start: 2023-09-05

## 2023-09-04 RX ORDER — THIAMINE MONONITRATE (VIT B1) 100 MG
100 TABLET ORAL DAILY
Qty: 30 TABLET | Refills: 3 | Status: CANCELLED | OUTPATIENT
Start: 2023-09-04

## 2023-09-04 RX ADMIN — PANTOPRAZOLE SODIUM 40 MG: 40 TABLET, DELAYED RELEASE ORAL at 05:27

## 2023-09-04 RX ADMIN — SODIUM CHLORIDE, PRESERVATIVE FREE 5 ML: 5 INJECTION INTRAVENOUS at 08:35

## 2023-09-04 RX ADMIN — THIAMINE HYDROCHLORIDE 100 MG: 100 INJECTION, SOLUTION INTRAMUSCULAR; INTRAVENOUS at 08:25

## 2023-09-04 RX ADMIN — SODIUM CHLORIDE, PRESERVATIVE FREE 10 ML: 5 INJECTION INTRAVENOUS at 08:26

## 2023-09-04 RX ADMIN — THERA TABS 1 TABLET: TAB at 08:35

## 2023-09-04 RX ADMIN — SODIUM CHLORIDE, POTASSIUM CHLORIDE, SODIUM LACTATE AND CALCIUM CHLORIDE: 600; 310; 30; 20 INJECTION, SOLUTION INTRAVENOUS at 05:29

## 2023-09-04 NOTE — DISCHARGE INSTRUCTIONS
Please schedule follow-up appointments with the following physicians:    -Primary care physician:  Dr. Leeanne Walter MD within one week for hospital follow up. Appointment may be scheduled by calling [phone]    -Gastroenterology for elevated iron, liver enzymes, and difficulty swallowing. Dr. Shanika Toro MD  within two weeks. Appointment may be scheduled at [phone]      -Continue taking Protonix 40mg once daily for abdominal discomfort.

## 2023-09-04 NOTE — PLAN OF CARE
Problem: Discharge Planning  Goal: Discharge to home or other facility with appropriate resources  Outcome: Completed     Problem: Pain  Goal: Verbalizes/displays adequate comfort level or baseline comfort level  Outcome: Completed     Problem: Skin/Tissue Integrity  Goal: Absence of new skin breakdown  Description: 1. Monitor for areas of redness and/or skin breakdown  2. Assess vascular access sites hourly  3. Every 4-6 hours minimum:  Change oxygen saturation probe site  4. Every 4-6 hours:  If on nasal continuous positive airway pressure, respiratory therapy assess nares and determine need for appliance change or resting period.   Outcome: Completed     Problem: Safety - Adult  Goal: Free from fall injury  Outcome: Completed     Problem: ABCDS Injury Assessment  Goal: Absence of physical injury  Outcome: Completed     Problem: Neurosensory - Adult  Goal: Achieves stable or improved neurological status  Outcome: Completed  Goal: Achieves maximal functionality and self care  Outcome: Completed     Problem: Nutrition Deficit:  Goal: Optimize nutritional status  Outcome: Completed

## 2023-09-04 NOTE — DISCHARGE SUMMARY
INTERNAL MEDICINE DEPARTMENT AT [address] [place] [address]  DISCHARGE SUMMARY    Patient ID: Denver Redman                                             Discharge Date: 9/4/2023   Patient's PCP: Kathlyn Severe, MD                                          Discharge Physician: Yue Newman DO MD  Admit Date: 9/1/2023   Admitting Physician: Suzan Bender MD    PROBLEMS DURING HOSPITALIZATION:  Present on Admission:   AMS (altered mental status)      DISCHARGE DIAGNOSES: Altered Mental status 2/2 iron overload vs. Hemochromatosis vs. Functional neurologic d/o     HPI:    Denver Redman is a 43 y.o. M with a pmhx of L adrenal adenoma and eczema who presents for an episode of altered mental status after being found in his car aphasic and unable to move on his own. His wife was present at the time of the incident and stated he sat down in the house and would not speak. He additionally needed to be helped  by EMS to stand and move. Mr. Benoit Krueger denies having focal weakness, sudden onset headache, dizziness, syncope, chest pain, shortness of breath, or palpitations at the time of the incident. He additionally denies having similar episodes in the past.  According the the patient he has a five day history of nausea and vomiting which he attributes to eating \"old batista\" that was prepared in the morning, but consumed in the afternoon for which he was prescribed Zofran by his pcp. He was imaged with CT head w/out contrast and MRI that did not show acute ischemia or hemorrhage. His hepatic panel revealed elevated AST//162 respectively. An iron panel revealed a ferritin of 1938.0 and iron of 240 for which he has been referred to follow up with gastroenterology for further workup. Mr. Benoit Krueger was monitored on EEG prior to discharge and has been instructed to follow up with neurology as indicated in his discharge orders for results.       Mr. Benoit Krueger was asymptomatic, stable, and at baseline at the time of his
epistaxis. Cardiovascular: No apparent JVD, RRR, no murmurs rubs or gallops. Pulses 2+ throughout. Pulmonary: Able to speak in full sentences without frequent pauses on room air. CTAB on posterior auscultation. Abdomen:  Non-distended. Normoactive bowel sounds. No guarding, rebound tenderness, or TTP in any quadrant. Musculoskeletal: No BUE or BLE edema. No truncal weakness. Skin: Warm, dry, acyanotic  Neurological: Alert, awake, and oriented to person, time, place, and situation. No dysdiadochokinesis with KACY.  BLE and BUE strength 5+ with sensation intact. Psychological: Cooperative. Normal affect, speech, behavior, and thought processes. DISCHARGE MEDICATION:     Medication List        START taking these medications      multivitamin Tabs tablet  Take 1 tablet by mouth daily  Start taking on: September 5, 2023     pantoprazole 40 MG tablet  Commonly known as: PROTONIX  Take 1 tablet by mouth every morning (before breakfast)  Start taking on: September 5, 2023            Elan Mater taking these medications      ibuprofen 800 MG tablet  Commonly known as: ADVIL;MOTRIN  Take 1 tablet by mouth every 8 hours as needed for Pain. Where to Get Your Medications        These medications were sent to [address] [facility], [address] [address] [address] [facility] , [address] [place] [address] Street      Phone: [phone]   pantoprazole 40 MG tablet       You can get these medications from any pharmacy    You don't need a prescription for these medications  multivitamin Tabs tablet       Disposition: home  Discharged Condition: Stable  Follow Up: Primary Care Physician in 1 week, Gastroenterology in 2 weeks, Neurology f/u for EEG results. Activity: activity as tolerated  Diet: regular diet  Wound Care: none needed    Time spent on discharge is more than 45 minutes.     Signed:  Ian Forbes DO,  PGY-1  9/4/2023

## 2023-09-04 NOTE — PLAN OF CARE
Problem: Discharge Planning  Goal: Discharge to home or other facility with appropriate resources  9/4/2023 0024 by Paige Robledo RN  Outcome: Progressing  Flowsheets  Taken 9/4/2023 0024  Discharge to home or other facility with appropriate resources:   Identify barriers to discharge with patient and caregiver   Arrange for needed discharge resources and transportation as appropriate  Taken 9/3/2023 2024  Discharge to home or other facility with appropriate resources: Identify barriers to discharge with patient and caregiver     Problem: Pain  Goal: Verbalizes/displays adequate comfort level or baseline comfort level  Outcome: Progressing  Flowsheets  Taken 9/4/2023 0024  Verbalizes/displays adequate comfort level or baseline comfort level:   Administer analgesics based on type and severity of pain and evaluate response   Assess pain using appropriate pain scale   Encourage patient to monitor pain and request assistance  Taken 9/3/2023 2300  Verbalizes/displays adequate comfort level or baseline comfort level: Encourage patient to monitor pain and request assistance  Taken 9/3/2023 2024  Verbalizes/displays adequate comfort level or baseline comfort level: Encourage patient to monitor pain and request assistance     Problem: Skin/Tissue Integrity  Goal: Absence of new skin breakdown  Description: 1. Monitor for areas of redness and/or skin breakdown  2. Assess vascular access sites hourly  3. Every 4-6 hours minimum:  Change oxygen saturation probe site  4. Every 4-6 hours:  If on nasal continuous positive airway pressure, respiratory therapy assess nares and determine need for appliance change or resting period.   Outcome: Progressing     Problem: Safety - Adult  Goal: Free from fall injury  9/4/2023 0024 by Paige Robledo RN  Outcome: Progressing  Flowsheets (Taken 9/3/2023 0405 by Calista Brown RN)  Free From Fall Injury: Instruct family/caregiver on patient safety     Problem: ABCDS Injury Assessment  Goal: Absence of physical injury  Outcome: Progressing  Flowsheets (Taken 9/4/2023 0024)  Absence of Physical Injury: Implement safety measures based on patient assessment     Problem: Neurosensory - Adult  Goal: Achieves stable or improved neurological status  9/4/2023 0024 by Luanne Altamirano RN  Outcome: Progressing  Flowsheets  Taken 9/4/2023 0024  Achieves stable or improved neurological status:   Assess for and report changes in neurological status   Initiate measures to prevent increased intracranial pressure  Taken 9/3/2023 2300  Achieves stable or improved neurological status: Assess for and report changes in neurological status  Taken 9/3/2023 2024  Achieves stable or improved neurological status: Assess for and report changes in neurological status     Problem: Neurosensory - Adult  Goal: Achieves maximal functionality and self care  Outcome: Progressing  Flowsheets  Taken 9/4/2023 0024  Achieves maximal functionality and self care: Monitor swallowing and airway patency with patient fatigue and changes in neurological status  Taken 9/3/2023 2300  Achieves maximal functionality and self care: Monitor swallowing and airway patency with patient fatigue and changes in neurological status  Taken 9/3/2023 2024  Achieves maximal functionality and self care: Monitor swallowing and airway patency with patient fatigue and changes in neurological status

## 2023-09-04 NOTE — PROGRESS NOTES
GI Pathogen PCR sent. Patient discharged to home with family and in no physical distress. Patient's peripheral IVs were removed at time of discharge, no complications. After visit summary provided and all questions were answered. Care plan and education were completed.      Electronically signed by Waleska Kapadia RN on 9/4/2023 at 5:31 PM

## 2023-09-04 NOTE — CARE COORDINATION
Case Management Assessment            Discharge Note                    Date / Time of Note: 9/4/2023 1:50 PM                  Discharge Note Completed by: Charan Doran RN    Patient Name: Radha Fuentes   YOB: 1980  Diagnosis: Paresis ([address]) [G83.9]  Abnormal liver function [R94.5]  Altered mental status, unspecified altered mental status type [R41.82]  Speech disturbance, unspecified type [R47.9]  Acute pancreatitis, unspecified complication status, unspecified pancreatitis type [K85.90]  AMS (altered mental status) [R41.82]   Date / Time: 9/1/2023  5:59 PM    Current PCP: Rhonda Hong MD  Clinic patient: No    Hospitalization in the last 30 days: No       Advance Directives:  Code Status: Full Code  [place] DNR form completed and on chart: No    Financial:  Payor: /      Pharmacy:    [address] [place] [address],[address] [phone]  [address] [place] [address]  Phone: [phone] Fax: [phone]      Assistance purchasing medications?:    Assistance provided by Case Management: None at this time    Does patient want to participate in local refill/ meds to beds program?:      Meds To Beds General Rules:  1. Can ONLY be done Monday- Friday between 8:30am-5pm  2. Prescription(s) must be in pharmacy by 3pm to be filled same day  3. Copy of patient's insurance/ prescription drug card and patient face sheet must be sent along with the prescription(s)  4. Cost of Rx cannot be added to hospital bill. If financial assistance is needed, please contact unit  or ;  or  CANNOT provide pharmacy voucher for patients co-pays  5.  Patients can then  the prescription on their way out of the hospital at discharge, or pharmacy can deliver to the bedside if staff is available. (payment due at time of pick-up or delivery - cash, check, or card accepted)     Able to afford home medications/ co-pay costs: COVID19    The Plan for Transition of Care is related to the following treatment goals of Paresis ([address]) [G83.9]  Abnormal liver function [R94.5]  Altered mental status, unspecified altered mental status type [R41.82]  Speech disturbance, unspecified type [R47.9]  Acute pancreatitis, unspecified complication status, unspecified pancreatitis type [K85.90]  AMS (altered mental status) [R41.82]    The Patient and/or patient representative Sharon Bunn and his family were provided with a choice of provider and agrees with the discharge plan Yes    Freedom of choice list was provided with basic dialogue that supports the patient's individualized plan of care/goals and shares the quality data associated with the providers.  Yes    Care Transitions patient: No    Miguel Ángel Jansen RN  [facility]  Case Management Department  Ph: [phone]  W/E Coverage

## 2023-09-04 NOTE — PLAN OF CARE
Problem: Discharge Planning  Goal: Discharge to home or other facility with appropriate resources  Outcome: Progressing  Flowsheets (Taken 9/2/2023 0357 by Mehnaz Mercado, RN)  Discharge to home or other facility with appropriate resources:   Identify barriers to discharge with patient and caregiver   Arrange for needed discharge resources and transportation as appropriate   Identify discharge learning needs (meds, wound care, etc)     Problem: Safety - Adult  Goal: Free from fall injury  Outcome: Progressing  Flowsheets (Taken 9/3/2023 0405 by Mehnaz Mercado, RN)  Free From Fall Injury: Instruct family/caregiver on patient safety     Problem: Neurosensory - Adult  Goal: Achieves stable or improved neurological status  Outcome: Progressing  Flowsheets (Taken 9/3/2023 0405 by Mehnaz Mercado, RN)  Achieves stable or improved neurological status: Assess for and report changes in neurological status

## 2023-09-05 LAB — GI PATHOGENS PNL STL NAA+PROBE: NORMAL

## 2023-09-05 NOTE — PROCEDURES
INTERPRETATION:  This 154-minute, rapid 8-channel EEG recording is normal.  No potentially epileptiform activity was present during the recording. The study was noted for technically limited study due to frequent disconnected electrodes. CLASSIFICATION:  Normal (awake and drowsy). DESCRIPTION:  BACKGROUND:  The awake recording revealed 9 Hz alpha activity over the posterior head region. The background EEG showed continuous intermixed 4 to 11 Hz theta/alpha activity. Frequent electrode disconnected was noted.     INTERICTAL DISCHARGES: None    SEIZURE BURDEN: 0%

## 2023-09-05 NOTE — PROCEDURES
Name: Kathy Estrella   : 1980   Interpreting Physician: Darcy Burdick MD   Referring Physician: Carol Rodriguez MD  Date of EE23      Clinical History: Altered mental status      Technical Summary:  The EEG was recorded in a digital format on a patient who is reported to be awake and drowsy state during the recording. The patient was not sleep deprived prior to the EEG. The recording revealed a normal background rhythm in the alpha frequency range that was maximal over the posterior head regions. The record was remarkable for the absence of epileptiform discharges. Photic stimulation was performed at various flash frequencies and without photoparoxysmal  response    Hyperventilation was not performed/  During the recording stage II sleep  was not seen. The EKG lead revealed no rhythm abnormalties. EEG Interpretation:   The EEG was normal in the awake and drowsy state. No epileptiform activity. Clinical correlation is recommended. The absence of epileptiform discharges on a single EEG does not rule out a diagnosis of  epilepsy or rule out non-convulsive or complex partial status epilepticus as a cause of altered mental status    No focal, lateralizing, or epileptiform features were seen during the recording.     Electronically signed by Darcy Burdick MD on 2023 at 5:43 PM

## 2023-09-08 LAB
HFE GENE MUT ANL BLD/T: NORMAL
HFE P.C282Y BLD/T QL: NEGATIVE
HFE P.H63D BLD/T QL: NEGATIVE
HFE P.S65C BLD/T QL: NEGATIVE
SPECIMEN SOURCE: NORMAL